# Patient Record
Sex: FEMALE | Race: WHITE | URBAN - METROPOLITAN AREA
[De-identification: names, ages, dates, MRNs, and addresses within clinical notes are randomized per-mention and may not be internally consistent; named-entity substitution may affect disease eponyms.]

---

## 2017-03-09 ENCOUNTER — OUTPATIENT (OUTPATIENT)
Dept: OUTPATIENT SERVICES | Facility: HOSPITAL | Age: 72
LOS: 1 days | Discharge: HOME | End: 2017-03-09

## 2017-06-27 DIAGNOSIS — Z12.31 ENCOUNTER FOR SCREENING MAMMOGRAM FOR MALIGNANT NEOPLASM OF BREAST: ICD-10-CM

## 2017-06-27 DIAGNOSIS — Z13.820 ENCOUNTER FOR SCREENING FOR OSTEOPOROSIS: ICD-10-CM

## 2017-06-27 DIAGNOSIS — N95.9 UNSPECIFIED MENOPAUSAL AND PERIMENOPAUSAL DISORDER: ICD-10-CM

## 2018-03-14 ENCOUNTER — OUTPATIENT (OUTPATIENT)
Dept: OUTPATIENT SERVICES | Facility: HOSPITAL | Age: 73
LOS: 1 days | Discharge: HOME | End: 2018-03-14

## 2018-03-14 DIAGNOSIS — Z12.31 ENCOUNTER FOR SCREENING MAMMOGRAM FOR MALIGNANT NEOPLASM OF BREAST: ICD-10-CM

## 2019-04-10 ENCOUNTER — OUTPATIENT (OUTPATIENT)
Dept: OUTPATIENT SERVICES | Facility: HOSPITAL | Age: 74
LOS: 1 days | Discharge: HOME | End: 2019-04-10
Payer: MEDICARE

## 2019-04-10 DIAGNOSIS — Z12.31 ENCOUNTER FOR SCREENING MAMMOGRAM FOR MALIGNANT NEOPLASM OF BREAST: ICD-10-CM

## 2019-04-10 PROCEDURE — 77067 SCR MAMMO BI INCL CAD: CPT | Mod: 26

## 2019-04-10 PROCEDURE — 77063 BREAST TOMOSYNTHESIS BI: CPT | Mod: 26

## 2019-04-11 DIAGNOSIS — Z13.820 ENCOUNTER FOR SCREENING FOR OSTEOPOROSIS: ICD-10-CM

## 2019-04-11 DIAGNOSIS — N95.9 UNSPECIFIED MENOPAUSAL AND PERIMENOPAUSAL DISORDER: ICD-10-CM

## 2020-06-17 ENCOUNTER — OUTPATIENT (OUTPATIENT)
Dept: OUTPATIENT SERVICES | Facility: HOSPITAL | Age: 75
LOS: 1 days | Discharge: HOME | End: 2020-06-17
Payer: MEDICARE

## 2020-06-17 DIAGNOSIS — Z12.31 ENCOUNTER FOR SCREENING MAMMOGRAM FOR MALIGNANT NEOPLASM OF BREAST: ICD-10-CM

## 2020-06-17 PROCEDURE — 77063 BREAST TOMOSYNTHESIS BI: CPT | Mod: 26

## 2020-06-17 PROCEDURE — 77067 SCR MAMMO BI INCL CAD: CPT | Mod: 26

## 2020-06-20 ENCOUNTER — OUTPATIENT (OUTPATIENT)
Dept: OUTPATIENT SERVICES | Facility: HOSPITAL | Age: 75
LOS: 1 days | Discharge: HOME | End: 2020-06-20
Payer: MEDICARE

## 2020-06-20 DIAGNOSIS — R92.8 OTHER ABNORMAL AND INCONCLUSIVE FINDINGS ON DIAGNOSTIC IMAGING OF BREAST: ICD-10-CM

## 2020-06-20 PROCEDURE — 77065 DX MAMMO INCL CAD UNI: CPT | Mod: 26,LT

## 2020-06-20 PROCEDURE — 76642 ULTRASOUND BREAST LIMITED: CPT | Mod: 26,LT

## 2020-06-20 PROCEDURE — G0279: CPT | Mod: 26

## 2020-07-14 PROBLEM — Z00.00 ENCOUNTER FOR PREVENTIVE HEALTH EXAMINATION: Status: ACTIVE | Noted: 2020-07-14

## 2020-07-15 ENCOUNTER — APPOINTMENT (OUTPATIENT)
Dept: BREAST CENTER | Facility: CLINIC | Age: 75
End: 2020-07-15
Payer: MEDICARE

## 2020-07-15 VITALS
HEIGHT: 60 IN | WEIGHT: 160 LBS | TEMPERATURE: 98.7 F | DIASTOLIC BLOOD PRESSURE: 80 MMHG | SYSTOLIC BLOOD PRESSURE: 128 MMHG | BODY MASS INDEX: 31.41 KG/M2

## 2020-07-15 DIAGNOSIS — N60.02 SOLITARY CYST OF LEFT BREAST: ICD-10-CM

## 2020-07-15 DIAGNOSIS — Z86.39 PERSONAL HISTORY OF OTHER ENDOCRINE, NUTRITIONAL AND METABOLIC DISEASE: ICD-10-CM

## 2020-07-15 DIAGNOSIS — Z86.79 PERSONAL HISTORY OF OTHER DISEASES OF THE CIRCULATORY SYSTEM: ICD-10-CM

## 2020-07-15 PROCEDURE — 99203 OFFICE O/P NEW LOW 30 MIN: CPT

## 2020-07-21 PROBLEM — Z86.79 HISTORY OF HYPERTENSION: Status: RESOLVED | Noted: 2020-07-21 | Resolved: 2020-07-21

## 2020-07-21 PROBLEM — Z86.39 HISTORY OF HYPERLIPIDEMIA: Status: RESOLVED | Noted: 2020-07-21 | Resolved: 2020-07-21

## 2020-07-21 PROBLEM — Z86.39 HISTORY OF HYPOTHYROIDISM: Status: RESOLVED | Noted: 2020-07-21 | Resolved: 2020-07-21

## 2020-07-21 RX ORDER — ASPIRIN 325 MG/1
TABLET, FILM COATED ORAL
Refills: 0 | Status: ACTIVE | COMMUNITY

## 2020-07-21 RX ORDER — CAL/D3/MAG11/ZINC/COP/MANG/BOR 600 MG-800
TABLET ORAL
Refills: 0 | Status: ACTIVE | COMMUNITY

## 2020-07-21 NOTE — HISTORY OF PRESENT ILLNESS
[FreeTextEntry1] : Palak is a 74 F with L breast cysts and normal appearing left axillary lymph nodes. \par \par She has no breast related complaints at this time.  She denies any breast pain, has not palpated any new palpable masses in either breast and denies any nipple discharge or retraction. \par \par Her most recent work up was as follows; \par 2020 -- b/l screening mammogram \par -scattered areas of fibroglandular densities\par -L: architectural distortion correlates with prior history of surgery \par -L: few nodular asymmetries \par -R: stable mass \par BIRADS 0\par \par 2020 -- L dx mammogram and US \par -nodular asymmetries in lateral aspect are 2 oval circumscribed masses, measuring 0.3 cm\par -also noted are oval circumscribed masses in L axilla at mammographic concern, likely normal axillary lymph nodes \par -no suspicious microcalcifications or areas of architectural distortion \par L US \par -@4:00 N 3-4, an oval circumscribed mass measuring 0.3 x 0.1 x 0.3 cm favored to be simple cyst seen on mammogram above. \par -@2-3 o'clock N6 there is an oval circumscribed mass measuring 0.3 x 0.2 x 0.4 cm favored to be simple cyst seen on mammogram above. \par -@2:00 N7 there is an oval circumscribed mass measuring 0.2 x 0.1 x 0.3 cm favored to be simple cyst. \par -@12:00 N 4-5 there is an oval circumscribed mass measuring 0.6 x 0.3 x 0.6 cm favored to be simple cyst. \par -@1-2 o'clock N 11 there is an oval circumscribed mass measuring 0.7 x 0.4 x 0.7 cm favored to be axillary lymph nodes as in the mammogram the above. \par -@1:00 N 11 there is an oval circumscribed mass (reniform shaped) measuring 1.2 x 0.7 x 0.9 cm favored to be axillary lymph node seen on mammogram of above. \par BIRADS 2\par \par HISTORICAL RISK FACTORS: \par -nL lumpectomy 35 years prior for reportedly benign disease  \par -no family history of breast or ovarian cancer \par -, age at first live birth was 25\par -no prior OCP use \par -no gyn surgeries\par

## 2020-07-21 NOTE — ASSESSMENT
[FreeTextEntry1] : Palak is a 74 F who has L breast cysts and benign appearing left axillary lymph nodes. \par \par I was not able to palpate any suspicious abnormalities within either breast on exam.  Her most recent imaging was a b/l screening mammogram and subsequent L dx mammogram and US which revealed multiple left breast cysts and 2 benign appearing left axillary lymph nodes, deemed  BIRADS 2.  She will be due for a b/l screening mammogram in 1 year.  This will be scheduled for her today. \par \par We discussed breast cysts. They are not pre-malignant nor do they have malignant potential and are hormonally influenced.  They may grow or shrink in size as well as resolve spontaneously and there is usually no intervention unless they are symptomatic.  In several large studies, patients with breast cysts and a positive family history had a higher relative risk of breast cancer (from 1.5 to 3).  She is asymptomatic from her left breast cysts so no intervention will be performed at this time.\par \par All of her questions were answered.  She knows to call with any further questions or concerns. \par \par PLAN: \par -b/l screening mammogram and US on 6/17/2021\par -f/up after imaging

## 2020-07-21 NOTE — PHYSICAL EXAM
[Normocephalic] : normocephalic [EOMI] : extra ocular movement intact [Atraumatic] : atraumatic [No Supraclavicular Adenopathy] : no supraclavicular adenopathy [No Cervical Adenopathy] : no cervical adenopathy [Examined in the supine and seated position] : examined in the supine and seated position [No dominant masses] : no dominant masses in right breast  [No dominant masses] : no dominant masses left breast [No Nipple Retraction] : no left nipple retraction [No Nipple Discharge] : no right nipple discharge [No Axillary Lymphadenopathy] : no left axillary lymphadenopathy [Soft] : abdomen soft [Not Tender] : non-tender [No Rashes] : no rashes [No Ulceration] : no ulceration [de-identified] : b/l nondiscrete nodularities, but no suspicious abnormalities palpated within either breast

## 2020-07-21 NOTE — DATA REVIEWED
[FreeTextEntry1] : EXAM: MG MAMMO SCREEN W DELICIA BI# \par \par \par PROCEDURE DATE: 06/17/2020 \par \par \par \par INTERPRETATION: HISTORY: \par Bilateral MG MAMMO SCREEN W DELICIA BI# was performed. Patient is 74 years old \par and is seen for screening. The patient has no personal history of cancer. \par The patient has a history of left excisional biopsy - benign. The patient \par has no family history of breast cancer. \par \par RISK ASSESSMENT: \par NCI Lifetime Risk: 4.6 \par Tyrer-Cuzick Lifetime Risk: 3.4 \par \par CLINICAL BREAST EXAM: \par The patient reports her last clinical breast exam was performed over one \par year ago. \par \par COMPARISON STUDIES: \par The present examination has been compared to prior imaging studies performed \par at [facility] on 11/03/2010, 12/23/2014, 01/06/2016, \par 03/09/2017, 03/14/2018 and 04/10/2019. \par \par MAMMOGRAM FINDINGS: \par Mammography was performed including the following views: bilateral \par craniocaudal with tomosynthesis, bilateral mediolateral oblique with \par tomosynthesis. The examination includes digital synthetic 2D and digital \par tomosynthesis 3D images. Additional imaging analysis was performed using CAD \par (computer-aided detection) software. \par \par There are scattered areas of fibroglandular density. \par \par Finding 1: There is an area of benign architectural distortion \par corresponding to the site of surgery seen in the left breast. \par \par Finding 2: There are a few nodular asymmetries seen in the left breast. \par \par Finding 3: There is a stable mass seen in the right breast. 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 \par \par Finding 2: Nodular asymmetries in the left breast require additional \par evaluation. \par \par RECOMMENDATION: \par Patient will be recalled for additional mammographic views and, if \par indicated, breast ultrasound. \par \par Finding 3: Stable mass in the right breast is benign finding. \par \par ASSESSMENT: \par BI-RADS Category 0: Incomplete: Needs Additional Imaging Evaluation \par \par The patient will be notified of these results by telephone, and will also be \par mailed a written summary in layman's terms. \par \par \par \par \par \par \par \par \par JOCELIN FLORES M.D., ATTENDING RADIOLOGIST \par This document has been electronically signed. Jun 17 2020 2:37PM \par \par \par EXAM: US BREAST LIMITED LT \par EXAM: MG MAMMO DIAG W DELICIA LT# \par \par \par PROCEDURE DATE: 06/20/2020 \par \par \par \par INTERPRETATION: Clinical History / Reason for exam: Call back for left \par breast masses from screening mammogram of 6/17/2020. \par \par The patient reports her last clinical breast examination was performed about \par twelve months ago. \par \par Family history: No family history of breast cancer. \par \par Comparisons: Mammograms dating back to 2017. \par \par Views obtained: left full field digital 2D and digital tomosynthesis images \par as well as spot views. \par \par Computer-aided detection was utilized in the interpretation of this \par examination. \par \par Breast composition: There are scattered areas of fibroglandular density. \par \par Findings: \par \par Mammogram: \par At the area of mammographic concern in the lateral aspect of the left breast \par there two oval circumscribed masses measuring 0.3 cm respectively. \par Also noted are oval circumscribed masses in the left axilla at the area of \par mammographic concern likely normal axillary lymph nodes. \par No suspicious microcalcifications or areas of architectural distortion is \par seen the left breast. \par \par Ultrasound: \par \par Targeted unilateral left breast ultrasound was performed. \par \par Left breast: \par At 4:00 N 3-4 there is an oval circumscribed mass measuring 0.3 x 0.1 x 0.3 \par cm favored to be simple cyst seen on mammogram above. \par At 2-3 o'clock N6 there is an oval circumscribed mass measuring 0.3 x 0.2 x \par 0.4 cm favored to be simple cyst seen on mammogram above. \par At 2:00 N7 there is an oval circumscribed mass measuring 0.2 x 0.1 x 0.3 cm \par favored to be simple cyst. \par At 12:00 N 4-5 there is an oval circumscribed mass measuring 0.6 x 0.3 x 0.6 \par cm favored to be simple cyst. \par At 1-2 o'clock N 11 there is an oval circumscribed mass measuring 0.7 x 0.4 \par x 0.7 cm favored to be axillary lymph nodes as in the mammogram the above. \par At 1:00 N 11 there is an oval circumscribed mass (reniform shaped) measuring \par 1.2 x 0.7 x 0.9 cm favored to be axillary lymph node seen on mammogram of \par above. \par \par Impression: No mammographic or sonographic evidence of malignancy. Simple \par appearing cysts left breast as well as normal-appearing left axillary lymph \par nodes. \par \par Recommendation: Any decision to biopsy the palpable abnormality should be \par based on the level of clinical concern. \par \par BI-RADS Category 2: Benign \par \par The above findings and recommendations were discussed with the patient at \par the time of the examination. \par \par \par \par \par \par \par RAFA REN M.D., ATTENDING RADIOLOGIST \par This document has been electronically signed. Jun 20 2020 10:21AM \par \par \par

## 2020-07-21 NOTE — PAST MEDICAL HISTORY
[Total Preg ___] : G[unfilled] [Live Births ___] : P[unfilled]  [Age At Live Birth ___] : Age at live birth: [unfilled] [FreeTextEntry7] : denies [FreeTextEntry5] : denies [FreeTextEntry6] : denies [FreeTextEntry8] : yes in past

## 2020-07-21 NOTE — REVIEW OF SYSTEMS
[Abn Vaginal Bleeding] : no unexplained vaginal bleeding [As Noted in HPI] : as noted in HPI [Skin Lesions] : no skin lesions [Skin Wound] : no skin wound [Breast Pain] : no breast pain [Breast Lump] : no breast lump [Negative] : Heme/Lymph

## 2020-11-21 ENCOUNTER — TRANSCRIPTION ENCOUNTER (OUTPATIENT)
Age: 75
End: 2020-11-21

## 2021-04-20 ENCOUNTER — APPOINTMENT (OUTPATIENT)
Dept: RHEUMATOLOGY | Facility: CLINIC | Age: 76
End: 2021-04-20
Payer: MEDICARE

## 2021-04-20 VITALS
OXYGEN SATURATION: 96 % | HEIGHT: 60 IN | DIASTOLIC BLOOD PRESSURE: 78 MMHG | TEMPERATURE: 96.4 F | HEART RATE: 83 BPM | BODY MASS INDEX: 33.38 KG/M2 | WEIGHT: 170 LBS | SYSTOLIC BLOOD PRESSURE: 122 MMHG

## 2021-04-20 DIAGNOSIS — M25.50 PAIN IN UNSPECIFIED JOINT: ICD-10-CM

## 2021-04-20 PROCEDURE — 99205 OFFICE O/P NEW HI 60 MIN: CPT

## 2021-04-20 RX ORDER — ROSUVASTATIN CALCIUM 5 MG/1
TABLET, FILM COATED ORAL
Refills: 0 | Status: DISCONTINUED | COMMUNITY
End: 2021-04-20

## 2021-04-20 RX ORDER — LEVOTHYROXINE SODIUM 137 UG/1
TABLET ORAL
Refills: 0 | Status: DISCONTINUED | COMMUNITY
End: 2021-04-20

## 2021-04-20 RX ORDER — LOSARTAN POTASSIUM AND HYDROCHLOROTHIAZIDE 100; 12.5 MG/1; MG/1
TABLET, FILM COATED ORAL
Refills: 0 | Status: DISCONTINUED | COMMUNITY
End: 2021-04-20

## 2021-04-22 RX ORDER — LOSARTAN POTASSIUM AND HYDROCHLOROTHIAZIDE 12.5; 1 MG/1; MG/1
100-12.5 TABLET ORAL
Refills: 0 | Status: ACTIVE | COMMUNITY

## 2021-04-22 RX ORDER — ROSUVASTATIN CALCIUM 20 MG/1
20 TABLET, FILM COATED ORAL
Refills: 0 | Status: ACTIVE | COMMUNITY

## 2021-04-22 RX ORDER — OMEPRAZOLE 20 MG/1
20 CAPSULE, DELAYED RELEASE ORAL
Refills: 0 | Status: ACTIVE | COMMUNITY

## 2021-04-22 RX ORDER — LEVOTHYROXINE SODIUM 0.07 MG/1
75 TABLET ORAL
Refills: 0 | Status: ACTIVE | COMMUNITY

## 2021-04-22 NOTE — ASSESSMENT
[FreeTextEntry1] : 75 year old female with a history of HTN, HLD, hypothyroidism here for evaluation of bilateral thumb pains.\par \par \par 1. Bilateral thumb pains/ carpal tunnel syndrome\par \par -Symptoms most likely CTS however with symmetric nature rule out RA with RF, CCP, ESR, CRP, and hand x-rays\par -Start Voltaren gel \par -If work up is unrevealing then will send to neuro for evaluation\par -Follow up in 3 weeks

## 2021-04-22 NOTE — HISTORY OF PRESENT ILLNESS
[FreeTextEntry1] : 75 year old female with a history of HTN, hypothyroidism, GERD, HLD here for evaluation of joint pain.\par \par Patient previously seen by Dr. Jacobsen last week. She received steroid injections in her palmar MCP for ? trigger finger last year. The next appointment was in 2 months and wanted to be seen sooner.\par \par Few months ago she started feeling pain in her thumbs, palmar aspect and 2nd finger. Denies AM stiffness or swelling in her joints. Pain is 7-8/10. Tylenol, Advil don't help. 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

## 2021-04-22 NOTE — PHYSICAL EXAM
[General Appearance - Alert] : alert [General Appearance - In No Acute Distress] : in no acute distress [Sclera] : the sclera and conjunctiva were normal [Extraocular Movements] : extraocular movements were intact [Outer Ear] : the ears and nose were normal in appearance [Hearing Threshold Finger Rub Not [place]] : hearing was normal [Neck Appearance] : the appearance of the neck was normal [Neck Cervical Mass (___cm)] : no neck mass was observed [Respiration, Rhythm And Depth] : normal respiratory rhythm and effort [Auscultation Breath Sounds / Voice Sounds] : lungs were clear to auscultation bilaterally [Heart Rate And Rhythm] : heart rate was normal and rhythm regular [Heart Sounds] : normal S1 and S2 [Bowel Sounds] : normal bowel sounds [Abdomen Soft] : soft [] : no rash [Skin Lesions] : no skin lesions [Sensation] : the sensory exam was normal to light touch and pinprick [Motor Exam] : the motor exam was normal [Affect] : the affect was normal [Mood] : the mood was normal [FreeTextEntry1] : No joint swelling. Tender over thumbs bilaterally without tenderness in forearms. No signs of synovitis

## 2021-05-19 ENCOUNTER — APPOINTMENT (OUTPATIENT)
Dept: RHEUMATOLOGY | Facility: CLINIC | Age: 76
End: 2021-05-19
Payer: MEDICARE

## 2021-05-19 VITALS
SYSTOLIC BLOOD PRESSURE: 142 MMHG | TEMPERATURE: 98 F | DIASTOLIC BLOOD PRESSURE: 70 MMHG | OXYGEN SATURATION: 98 % | HEIGHT: 60 IN | BODY MASS INDEX: 33.38 KG/M2 | WEIGHT: 170 LBS | HEART RATE: 73 BPM

## 2021-05-19 PROCEDURE — 99214 OFFICE O/P EST MOD 30 MIN: CPT

## 2021-05-19 RX ORDER — IBUPROFEN 600 MG/1
600 TABLET, FILM COATED ORAL 3 TIMES DAILY
Qty: 42 | Refills: 0 | Status: ACTIVE | COMMUNITY
Start: 2021-05-19 | End: 1900-01-01

## 2021-05-21 ENCOUNTER — NON-APPOINTMENT (OUTPATIENT)
Age: 76
End: 2021-05-21

## 2021-07-16 ENCOUNTER — OUTPATIENT (OUTPATIENT)
Dept: OUTPATIENT SERVICES | Facility: HOSPITAL | Age: 76
LOS: 1 days | Discharge: HOME | End: 2021-07-16
Payer: MEDICARE

## 2021-07-16 DIAGNOSIS — Z12.31 ENCOUNTER FOR SCREENING MAMMOGRAM FOR MALIGNANT NEOPLASM OF BREAST: ICD-10-CM

## 2021-07-16 PROCEDURE — 76641 ULTRASOUND BREAST COMPLETE: CPT | Mod: 26,50

## 2021-07-16 PROCEDURE — 77063 BREAST TOMOSYNTHESIS BI: CPT | Mod: 26

## 2021-07-16 PROCEDURE — 77067 SCR MAMMO BI INCL CAD: CPT | Mod: 26

## 2021-07-21 ENCOUNTER — OUTPATIENT (OUTPATIENT)
Dept: OUTPATIENT SERVICES | Facility: HOSPITAL | Age: 76
LOS: 1 days | Discharge: HOME | End: 2021-07-21

## 2021-07-22 DIAGNOSIS — Z13.820 ENCOUNTER FOR SCREENING FOR OSTEOPOROSIS: ICD-10-CM

## 2021-07-22 DIAGNOSIS — N95.9 UNSPECIFIED MENOPAUSAL AND PERIMENOPAUSAL DISORDER: ICD-10-CM

## 2021-07-28 DIAGNOSIS — R92.2 INCONCLUSIVE MAMMOGRAM: ICD-10-CM

## 2021-08-26 NOTE — HISTORY OF PRESENT ILLNESS
[FreeTextEntry1] : Palak is a 74 F with L breast cysts and normal appearing left axillary lymph nodes. \par \par She has no breast related complaints at this time.  She denies any breast pain, has not palpated any new palpable masses in either breast and denies any nipple discharge or retraction. \par \par Her most recent work up was as follows; \par 2020 -- b/l screening mammogram \par -scattered areas of fibroglandular densities\par -L: architectural distortion correlates with prior history of surgery \par -L: few nodular asymmetries \par -R: stable mass \par BIRADS 0\par \par 2020 -- L dx mammogram and US \par -nodular asymmetries in lateral aspect are 2 oval circumscribed masses, measuring 0.3 cm\par -also noted are oval circumscribed masses in L axilla at mammographic concern, likely normal axillary lymph nodes \par -no suspicious microcalcifications or areas of architectural distortion \par L US \par -@4:00 N 3-4, an oval circumscribed mass measuring 0.3 x 0.1 x 0.3 cm favored to be simple cyst seen on mammogram above. \par -@2-3 o'clock N6 there is an oval circumscribed mass measuring 0.3 x 0.2 x 0.4 cm favored to be simple cyst seen on mammogram above. \par -@2:00 N7 there is an oval circumscribed mass measuring 0.2 x 0.1 x 0.3 cm favored to be simple cyst. \par -@12:00 N 4-5 there is an oval circumscribed mass measuring 0.6 x 0.3 x 0.6 cm favored to be simple cyst. \par -@1-2 o'clock N 11 there is an oval circumscribed mass measuring 0.7 x 0.4 x 0.7 cm favored to be axillary lymph nodes as in the mammogram the above. \par -@1:00 N 11 there is an oval circumscribed mass (reniform shaped) measuring 1.2 x 0.7 x 0.9 cm favored to be axillary lymph node seen on mammogram of above. \par BIRADS 2\par \par HISTORICAL RISK FACTORS: \par -nL lumpectomy 35 years prior for reportedly benign disease  \par -no family history of breast or ovarian cancer \par -, age at first live birth was 25\par -no prior OCP use \par -no gyn surgeries\par \par \par INTERVAL HISTORY 21:\par Palak is 75 year old female \par \par On her most recent imaging:\par 21: B/L screening mammo and US\par         -scattered areas of fibroglandular density\par LEFT -  benign architectural distortion corresponding to the site of surgery \par \par U/S LEFT\par        - 2N7 circumscribed mass seen on screening mammogram and is benign\par         -scattered bilateral subcentimeter benign cysts\par      Deemed BIRADS2

## 2021-08-26 NOTE — ASSESSMENT
[FreeTextEntry1] : Palak is a 75 F who has L breast cysts and benign appearing left axillary lymph nodes. \par \par On exam, \par \par \par Her most recent imaging was a b/l screening mammogram and US which revealed multiple left breast cysts and bilateral subcentimeter benign cysts.  deemed  BIRADS 2.  She will be due for a b/l screening mammogram in 1 year.  This will be scheduled for her today. \par \par We discussed breast cysts. They are not pre-malignant nor do they have malignant potential and are hormonally influenced.  They may grow or shrink in size as well as resolve spontaneously and there is usually no intervention unless they are symptomatic.  In several large studies, patients with breast cysts and a positive family history had a higher relative risk of breast cancer (from 1.5 to 3).  She is asymptomatic from her left breast cysts so no intervention will be performed at this time.\par \par All of her questions were answered.  She knows to call with any further questions or concerns. \par \par PLAN: \par -b/l screening mammogram and US on 6/17/2022\par -f/up after imaging

## 2021-08-26 NOTE — DATA REVIEWED
[FreeTextEntry1] : EXAM:  MG MAMMO SCREEN W DELICIA BI#      \par \par \par PROCEDURE DATE:  07/16/2021  \par \par \par \par INTERPRETATION:  HISTORY:\par Bilateral MG MAMMO SCREEN W DELICIA BI# was performed. Patient is 75 years old and is seen for screening. The patient has no personal history of cancer. The patient has a history of left excisional biopsy - benign. The patient has no family history of breast cancer.\par \par RISK ASSESSMENT:\par NCI Lifetime Risk: 4.3\par Tyrer-Fridazick Lifetime Risk: 2.5\par \par CLINICAL BREAST EXAM:\par The patient reports her last clinical breast exam was performed over one year ago.\par \par COMPARISON STUDIES:\par The present examination has been compared to prior imaging studies performed at [facility] on 03/09/2017, 03/14/2018, 04/10/2019, 06/17/2020 and 06/20/2020.\par \par MAMMOGRAM FINDINGS:\par Mammography was performed including the following views: bilateral craniocaudal with tomosynthesis, bilateral mediolateral oblique with tomosynthesis, and left axillary tail.  The examination includes digital synthetic 2D and digital tomosynthesis 3D images. Additional imaging analysis was performed using CAD (computer-aided detection) software.\par \par There are scattered areas of fibroglandular density.\par \par Finding 1:  There is an area of benign architectural distortion corresponding to the site of surgery seen in the left breast.\par \par Finding 2:  There is a benign mass seen in the upper outer quadrant of the left breast.  This corresponds with a benign cyst seen on concurrent ultrasound.\par \par No suspicious mass, grouping of calcifications, or other abnormality is identified.\par \par IMPRESSION:\par There is no mammographic evidence of malignancy.\par \par RECOMMENDATION:\par Unless otherwise indicated by clinical findings, annual screening mammography recommended.\par \par ASSESSMENT:\par BI-RADS Category 2:  Benign\par \par The patient will be notified of these results by telephone, and will also be mailed a written summary in layman's terms.\par \par EXAM:  MG US BREAST COMPLETE BI      \par \par \par PROCEDURE DATE:  07/16/2021  \par \par \par \par INTERPRETATION:  Clinical History / Reason for exam: Dense breast screening.\par \par The patient reports her last clinical breast examination was performed over one year ago.\par \par Family history: None\par \par Comparisons: Priors dating back to 2012.\par \par Findings:\par \par Ultrasound:\par \par  Bilateral whole breast ultrasound was performed.\par \par No suspicious solid or cystic masses. There are scattered bilateral subcentimeter benign cysts. The cyst at the left breast 2:00 position 7 cm from the nipple corresponds with a circumscribed mass seen on screening mammogram and is benign. No axillary adenopathy.\par \par Impression: No sonographic evidence of malignancy.\par \par Recommendation: Unless otherwise indicated by clinical findings, annual screening mammography recommended.\par \par BI-RADS Category 2: Benign\par \par

## 2021-08-26 NOTE — DATA REVIEWED
[FreeTextEntry1] : EXAM:  MG MAMMO SCREEN W DELICIA BI#      \par \par \par PROCEDURE DATE:  07/16/2021  \par \par \par \par INTERPRETATION:  HISTORY:\par Bilateral MG MAMMO SCREEN W DELICIA BI# was performed. Patient is 75 years old and is seen for screening. The patient has no personal history of cancer. The patient has a history of left excisional biopsy - benign. The patient has no family history of breast cancer.\par \par RISK ASSESSMENT:\par NCI Lifetime Risk: 4.3\par Tyrer-Fridazick Lifetime Risk: 2.5\par \par CLINICAL BREAST EXAM:\par The patient reports her last clinical breast exam was performed over one year ago.\par \par COMPARISON STUDIES:\par The present examination has been compared to prior imaging studies performed at [facility] on 03/09/2017, 03/14/2018, 04/10/2019, 06/17/2020 and 06/20/2020.\par \par MAMMOGRAM FINDINGS:\par Mammography was performed including the following views: bilateral craniocaudal with tomosynthesis, bilateral mediolateral oblique with tomosynthesis, and left axillary tail.  The examination includes digital synthetic 2D and digital tomosynthesis 3D images. Additional imaging analysis was performed using CAD (computer-aided detection) software.\par \par There are scattered areas of fibroglandular density.\par \par Finding 1:  There is an area of benign architectural distortion corresponding to the site of surgery seen in the left breast.\par \par Finding 2:  There is a benign mass seen in the upper outer quadrant of the left breast.  This corresponds with a benign cyst seen on concurrent ultrasound.\par \par No suspicious mass, grouping of calcifications, or other abnormality is identified.\par \par IMPRESSION:\par There is no mammographic evidence of malignancy.\par \par RECOMMENDATION:\par Unless otherwise indicated by clinical findings, annual screening mammography recommended.\par \par ASSESSMENT:\par BI-RADS Category 2:  Benign\par \par The patient will be notified of these results by telephone, and will also be mailed a written summary in layman's terms.\par \par EXAM:  MG US BREAST COMPLETE BI      \par \par \par PROCEDURE DATE:  07/16/2021  \par \par \par \par INTERPRETATION:  Clinical History / Reason for exam: Dense breast screening.\par \par The patient reports her last clinical breast examination was performed over one year ago.\par \par Family history: None\par \par Comparisons: Priors dating back to 2012.\par \par Findings:\par \par Ultrasound:\par \par  Bilateral whole breast ultrasound was performed.\par \par No suspicious solid or cystic masses. There are scattered bilateral subcentimeter benign cysts. The cyst at the left breast 2:00 position 7 cm from the nipple corresponds with a circumscribed mass seen on screening mammogram and is benign. No axillary adenopathy.\par \par Impression: No sonographic evidence of malignancy.\par \par Recommendation: Unless otherwise indicated by clinical findings, annual screening mammography recommended.\par \par BI-RADS Category 2: Benign\par \par

## 2021-08-26 NOTE — PAST MEDICAL HISTORY
[Total Preg ___] : G[unfilled] [Live Births ___] : P[unfilled]  [Age At Live Birth ___] : Age at live birth: [unfilled] [FreeTextEntry5] : denies [FreeTextEntry6] : denies [FreeTextEntry7] : denies [FreeTextEntry8] : yes in past

## 2021-08-30 ENCOUNTER — APPOINTMENT (OUTPATIENT)
Dept: BREAST CENTER | Facility: CLINIC | Age: 76
End: 2021-08-30

## 2022-10-15 ENCOUNTER — OUTPATIENT (OUTPATIENT)
Dept: OUTPATIENT SERVICES | Facility: HOSPITAL | Age: 77
LOS: 1 days | Discharge: HOME | End: 2022-10-15

## 2022-10-15 DIAGNOSIS — Z12.31 ENCOUNTER FOR SCREENING MAMMOGRAM FOR MALIGNANT NEOPLASM OF BREAST: ICD-10-CM

## 2022-10-15 PROCEDURE — 77067 SCR MAMMO BI INCL CAD: CPT | Mod: 26

## 2022-10-15 PROCEDURE — 77063 BREAST TOMOSYNTHESIS BI: CPT | Mod: 26

## 2022-10-31 ENCOUNTER — APPOINTMENT (OUTPATIENT)
Dept: ORTHOPEDIC SURGERY | Facility: CLINIC | Age: 77
End: 2022-10-31

## 2022-10-31 PROCEDURE — 99214 OFFICE O/P EST MOD 30 MIN: CPT

## 2022-10-31 PROCEDURE — 73130 X-RAY EXAM OF HAND: CPT | Mod: 50

## 2022-10-31 NOTE — ASSESSMENT
[FreeTextEntry1] : Patient comes in with complaints of bilateral hand pain.  She has numbness and tingling as well.  I saw her a couple years ago.  She had carpal tunnel and arthritis.  She was given AOA thumb braces and cock-up wrist splints.  She says the braces do not help her right now.  She does not wear the AOA braces but does wear the cock-up wrist splint.  Her main complaint is pain in the fingertips and numbness and tingling.  The left side is worse than the right-hand side.\par \par B/L hand: \par Tender volar wrist \par Good finger ROM \par +Tinels \par +Phalens \par +Compression test \par Decreased sensation median nerve distribution  On the left\par  positive basal grind\par  tender palpation basal joint\par  mild swelling basal joint\par \par The patient was advised of the diagnosis.  The natural history of the pathology was explained in full to the patient in layman's terms. We discussed the nature of the nerve as an electrical cable and what happens to the nerve in carpal tunnel syndrome.  We discussed that treatment for night symptoms included night bracing.  We discussed the possibility of injection when symptoms were intermittent or in patients who were unwilling to undergo surgery with constant symptoms.  We discussed that injection is a diagnostic and therapeutic aide and what this means.  We discussed the use of nerve testing in cases when diagnosis was in doubt or for confirmation to exclude alternate pathology.  We discussed that if symptoms were 24/7 surgery was recommended to give the nerve the best chance to recover but that once symptoms were constant, the nerve may not recover even with surgery.  We discussed that if left alone the nerve progression could worsen and that treatment was indicated to prevent progression of nerve compression.  The longer the nerve is left, the more likely to cause worsening irreversible damage.  All questions were answered.  The risks and benefits of surgical and non-surgical treatment alternatives were explained in full to the patient.\par  the sending the patient for an EMG NCV.  Most likely were going to be moving forward with surgical intervention.  She will follow up with me once the EMG NCV is complete.\par \par The patient was advised of the diagnosis. The natural history of the pathology was explained in full to the patient in layman's terms. We reviewed that the forces applied to the thumb tip are magnified 12-14 times at the thumb cmc joint.  We also reviewed the progression of arthritis with early arthritis showing minimal to no xray changes.  We discussed treatment options, including activity modification(demonstrated), medicine(topical and oral), bracing, therapy, injection and eventually surgery.  We reviewed the r/b of each.  All questions were answered and the patient verbalized understanding\par \par

## 2022-11-04 ENCOUNTER — APPOINTMENT (OUTPATIENT)
Dept: PAIN MANAGEMENT | Facility: CLINIC | Age: 77
End: 2022-11-04

## 2022-11-04 PROCEDURE — 95911 NRV CNDJ TEST 9-10 STUDIES: CPT

## 2022-11-04 PROCEDURE — 95886 MUSC TEST DONE W/N TEST COMP: CPT

## 2022-11-08 ENCOUNTER — APPOINTMENT (OUTPATIENT)
Dept: ORTHOPEDIC SURGERY | Facility: CLINIC | Age: 77
End: 2022-11-08

## 2022-11-08 PROCEDURE — 99214 OFFICE O/P EST MOD 30 MIN: CPT

## 2022-11-08 NOTE — ASSESSMENT
[FreeTextEntry1] : Patient comes in after get an EMG NCV.  Her hands are very bad.  The left side is worse than the right-hand side.  She has numbness and tingling in the fingers at all times and has a lot of nighttime pain.  She says now it going up to her shoulder.\par \par B/L hand: \par Tender volar wrist \par Good finger ROM \par +Tinels \par +Phalens \par +Compression test \par Decreased sensation median nerve distribution  On the left\par  positive basal grind\par  tender palpation basal joint\par  mild swelling basal joint\par \par We discussed the recommendation for carpal tunnel surgery- We reviewed that the goal of surgery is to prevent worsening and give the nerve a chance to recover. If symptoms are constant there is a chance that the nerve is already too badly damaged and no longer has the potential to recover.Risks, benefits, and alternatives of surgery discussed with patient (and family).Risks including but not limited to infection, blood loss, tendon damage, muscle damage, nerve damage, stiffness, pain, no resolution of symptoms, CRPS, loss of function, potential for secondary surgery, loss of limb or life.Patient understands and was allowed to voice questions or concerns.They agree to surgery\par   The patient opted to move forward with surgery on the left side today.  She will be booked for surgical intervention.  In addition the basal joint arthritis is not bothering her as much as the carpal tunnel and she would like to focus on that now.  She will do the right side after the left side when she feels better.

## 2022-11-08 NOTE — REASON FOR VISIT
[FreeTextEntry2] : Arthritis of carpometacarpal (CMC) joint of both thumbs and Bilateral carpal tunnel syndrome

## 2022-12-12 ENCOUNTER — APPOINTMENT (OUTPATIENT)
Dept: ORTHOPEDIC SURGERY | Facility: AMBULATORY SURGERY CENTER | Age: 77
End: 2022-12-12

## 2022-12-12 PROCEDURE — 29848 WRIST ENDOSCOPY/SURGERY: CPT | Mod: 50

## 2022-12-16 ENCOUNTER — APPOINTMENT (OUTPATIENT)
Dept: ORTHOPEDIC SURGERY | Facility: CLINIC | Age: 77
End: 2022-12-16

## 2022-12-16 PROCEDURE — 99024 POSTOP FOLLOW-UP VISIT: CPT

## 2022-12-16 NOTE — ASSESSMENT
[FreeTextEntry1] : Patient comes in status post endoscopic carpal tunnel release bilateral upper extremities.  She is doing well.  She has no more nighttime pain.  She has some incisional pain.  She has some right pain in the thumb.  She has no other complaints today.\par \par   Incision sites he clean dry and intact, no signs infection, sutures removed,  tender to palpation over basal joint of right thumb, full range of motion of fingers, decreased sensation in left long finger\par \par  status post bilateral endoscopic carpal tunnel releases.  Patient is doing well.  Sutures removed.  A month she is going to start with scar massage.  She will continue working on range of motion.  She will not to let a complete repetitive stress.  I will see her back in about 3 weeks for re-evaluation.  If she has a problem she will let me know.

## 2023-01-06 ENCOUNTER — APPOINTMENT (OUTPATIENT)
Dept: ORTHOPEDIC SURGERY | Facility: CLINIC | Age: 78
End: 2023-01-06
Payer: MEDICARE

## 2023-01-06 DIAGNOSIS — G56.03 CARPAL TUNNEL SYNDROM,BILATERAL UPPER LIMBS: ICD-10-CM

## 2023-01-06 PROCEDURE — 99024 POSTOP FOLLOW-UP VISIT: CPT

## 2023-01-06 NOTE — ASSESSMENT
[FreeTextEntry1] : Patient comes in status post endoscopic carpal tunnel release bilateral upper extremities.  She is doing well.  She has no more nighttime pain. Her main complaint is decreased sensation in the left long finger.  She has no incisional pain.  Her sensation has mostly return in the rest of the fingers slightly decreased at the tip of the left index finger.\par \par   Incision sites well healed, full range of motion of fingers, decreased sensation in long finger slightly decreased sensation in the tip of the left index finger, normal sensation in the left thumb and the right hand.\par \par  status post bilateral endoscopic carpal tunnel releases.  Patient is doing well.   The patient is going to continue with scar massage sees some hard scar tissue in the area.  She is going to continue doing this.  She is going to follow up most likely in 2 months.  I did discuss with her that the sensation may never fully return.  We have a year to see if it will return.  We will monitor it.  She does not have other complaints today.  She may resume all normal activities.

## 2023-03-10 ENCOUNTER — APPOINTMENT (OUTPATIENT)
Dept: ORTHOPEDIC SURGERY | Facility: CLINIC | Age: 78
End: 2023-03-10

## 2023-07-18 ENCOUNTER — APPOINTMENT (OUTPATIENT)
Dept: ORTHOPEDIC SURGERY | Facility: CLINIC | Age: 78
End: 2023-07-18

## 2023-07-18 ENCOUNTER — APPOINTMENT (OUTPATIENT)
Dept: ORTHOPEDIC SURGERY | Facility: CLINIC | Age: 78
End: 2023-07-18
Payer: MEDICARE

## 2023-07-18 PROCEDURE — 73110 X-RAY EXAM OF WRIST: CPT | Mod: 50

## 2023-07-18 PROCEDURE — 20604 DRAIN/INJ JOINT/BURSA W/US: CPT | Mod: RT

## 2023-07-18 PROCEDURE — 99214 OFFICE O/P EST MOD 30 MIN: CPT | Mod: 25

## 2023-07-19 NOTE — ASSESSMENT
[FreeTextEntry1] : The patient comes in with pain in both of her hands. She states the pain is on the top of her hands. She noticed some swelling at the base of the thumb.\par \par B/L hand: \par +thumb CMC swelling \par +thumb CMC tenderness \par Decreased thumb ROM \par +Basal grind test\par \par x-ray bilateral basal joint arthritis right greater than left\par \par The patient was advised of the diagnosis. The natural history of the pathology was explained in full to the patient in layman's terms. We reviewed that the forces applied to the thumb tip are magnified 12-14 times at the thumb cmc joint.  We also reviewed the progression of arthritis with early arthritis showing minimal to no xray changes.  We discussed treatment options, including activity modification(demonstrated), medicine(topical and oral), bracing, therapy, injection and eventually surgery.  We reviewed the r/b of each.  All questions were answered and the patient verbalized understanding\par The patient will wear AOA braces to wear when she is active. \par \par After a discussion of the risks, benefits and alternatives along with the expectations, the patient was amenable to injection.  The indication for injection is pain, inflammation and radiographically confirmed arthritis.\par Anesthesia: ethyl chloride sprayed topically\par Dexamethasone: An injection of 0.9cc\par Lidocaine: An injection of Lidocaine 1% 0.1 cc\par \par Patient has tried OTC's including aspirin, Ibuprofen, Aleve etc or prescription NSAIDS, and/or exercises at home and/ or\par physical therapy without satisfactory response.\par After verbal consent using sterile preparation and technique. The risks, benefits, and alternatives to cortisone injection\par were explained in full to the patient. Risks outlined include but are not limited to infection, sepsis, bleeding, scarring, skin\par discoloration, temporary increase in pain, syncopal episode, failure to resolve symptoms, allergic reaction, symptom\par recurrence, and elevation of blood sugar in diabetics. Patient understood the risks. All questions were answered. After\par discussion of options, patient requested an injection. Oral informed consent was obtained and sterile prep was done of the\par injection site. Sterile technique was utilized for the procedure including the preparation of the solutions used for the\par injection. Prep with betadine  and alcohol locally to site.  Using ultrasound guidance, the joint was visualized.  The area was then sprayed with ethyl chloride and an injection was given.  Patient tolerated the procedure well. Advised to ice the injection site this evening.\par The patient opted for an injection for her right side today. She will make an appointment to do her left side.  She decided to do 1 today because she can have more pain for day or 2 and I do not want her to not be able to use both of her hands for activities of daily living.  I am recommending that she use both of the AOA braces.\par \par \par

## 2023-08-22 ENCOUNTER — APPOINTMENT (OUTPATIENT)
Dept: ORTHOPEDIC SURGERY | Facility: CLINIC | Age: 78
End: 2023-08-22
Payer: MEDICARE

## 2023-08-22 DIAGNOSIS — M18.0 BILATERAL PRIMARY OSTEOARTHRITIS OF FIRST CARPOMETACARPAL JOINTS: ICD-10-CM

## 2023-08-22 PROCEDURE — 99213 OFFICE O/P EST LOW 20 MIN: CPT | Mod: 25

## 2023-08-22 PROCEDURE — 20604 DRAIN/INJ JOINT/BURSA W/US: CPT | Mod: LT

## 2023-08-25 PROBLEM — M18.0 ARTHRITIS OF CARPOMETACARPAL (CMC) JOINT OF BOTH THUMBS: Status: ACTIVE | Noted: 2022-10-31

## 2023-08-25 NOTE — ASSESSMENT
[FreeTextEntry1] : The patient comes in for a follow up. She states the injection did not help her. She states the brace did not help.   B/L hand:  +thumb CMC swelling  +thumb CMC tenderness  Decreased thumb ROM  +Basal grind test  The patient was advised of the diagnosis. The natural history of the pathology was explained in full to the patient in layman's terms. We reviewed that the forces applied to the thumb tip are magnified 12-14 times at the thumb cmc joint.  We also reviewed the progression of arthritis with early arthritis showing minimal to no xray changes.  We discussed treatment options, including activity modification(demonstrated), medicine(topical and oral), bracing, therapy, injection and eventually surgery.  We reviewed the r/b of each.  All questions were answered and the patient verbalized understanding The patient will wear AOA braces to wear when she is active. She wished to move forward with an injection on the left side.  We discussed if injections don't work there is the option for surgery but right now she does not wish to move forward with that.  She will maybe try another injection on the right in the future after 3 months have passed.  After a discussion of the risks, benefits and alternatives along with the expectations, the patient was amenable to injection.  The indication for injection is pain, inflammation and radiographically confirmed arthritis. Anesthesia: ethyl chloride sprayed topically Dexamethasone: An injection of 0.9cc Lidocaine: An injection of Lidocaine 1% 0.1 cc  Patient has tried OTC's including aspirin, Ibuprofen, Aleve etc or prescription NSAIDS, and/or exercises at home and/ or physical therapy without satisfactory response. After verbal consent using sterile preparation and technique. The risks, benefits, and alternatives to cortisone injection were explained in full to the patient. Risks outlined include but are not limited to infection, sepsis, bleeding, scarring, skin discoloration, temporary increase in pain, syncopal episode, failure to resolve symptoms, allergic reaction, symptom recurrence, and elevation of blood sugar in diabetics. Patient understood the risks. All questions were answered. After discussion of options, patient requested an injection. Oral informed consent was obtained and sterile prep was done of the injection site. Sterile technique was utilized for the procedure including the preparation of the solutions used for the injection. Prep with betadine  and alcohol locally to site.  Using ultrasound guidance, the joint was visualized.  The area was then sprayed with ethyl chloride and an injection was given.  Patient tolerated the procedure well. Advised to ice the injection site this evening. The patient opted for an injection for her left basal joint today. The patient tolerated the injection well. The patient will continue with bracing. She will continue taking anti-inflammatories. She will follow up in 3 months.

## 2023-10-26 ENCOUNTER — OUTPATIENT (OUTPATIENT)
Dept: OUTPATIENT SERVICES | Facility: HOSPITAL | Age: 78
LOS: 1 days | End: 2023-10-26
Payer: MEDICARE

## 2023-10-26 DIAGNOSIS — Z13.820 ENCOUNTER FOR SCREENING FOR OSTEOPOROSIS: ICD-10-CM

## 2023-10-26 DIAGNOSIS — Z00.8 ENCOUNTER FOR OTHER GENERAL EXAMINATION: ICD-10-CM

## 2023-10-26 DIAGNOSIS — Z12.31 ENCOUNTER FOR SCREENING MAMMOGRAM FOR MALIGNANT NEOPLASM OF BREAST: ICD-10-CM

## 2023-10-26 PROCEDURE — 77080 DXA BONE DENSITY AXIAL: CPT

## 2023-10-26 PROCEDURE — 77063 BREAST TOMOSYNTHESIS BI: CPT | Mod: 26

## 2023-10-26 PROCEDURE — 77063 BREAST TOMOSYNTHESIS BI: CPT

## 2023-10-26 PROCEDURE — 77067 SCR MAMMO BI INCL CAD: CPT | Mod: 26

## 2023-10-26 PROCEDURE — 77067 SCR MAMMO BI INCL CAD: CPT

## 2023-10-27 DIAGNOSIS — Z12.31 ENCOUNTER FOR SCREENING MAMMOGRAM FOR MALIGNANT NEOPLASM OF BREAST: ICD-10-CM

## 2023-10-27 DIAGNOSIS — Z13.820 ENCOUNTER FOR SCREENING FOR OSTEOPOROSIS: ICD-10-CM
